# Patient Record
Sex: MALE | Race: NATIVE HAWAIIAN OR OTHER PACIFIC ISLANDER | NOT HISPANIC OR LATINO | URBAN - METROPOLITAN AREA
[De-identification: names, ages, dates, MRNs, and addresses within clinical notes are randomized per-mention and may not be internally consistent; named-entity substitution may affect disease eponyms.]

---

## 2020-11-12 ENCOUNTER — OFFICE VISIT (OUTPATIENT)
Dept: URBAN - METROPOLITAN AREA CLINIC 13 | Facility: CLINIC | Age: 65
End: 2020-11-12
Payer: MEDICARE

## 2020-11-12 PROCEDURE — 92012 INTRM OPH EXAM EST PATIENT: CPT | Performed by: OPHTHALMOLOGY

## 2020-11-12 PROCEDURE — 92134 CPTRZ OPH DX IMG PST SGM RTA: CPT | Performed by: OPHTHALMOLOGY

## 2020-11-12 ASSESSMENT — INTRAOCULAR PRESSURE
OS: 15
OD: 14

## 2020-11-12 NOTE — IMPRESSION/PLAN
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
s/p surgery in 2015 Plan: Dense TRD under SO OU Ischemic with HM and LP vision Here for eval for possible DME as he is interested in clinical trial participation D/w patient and family that limiting factor is fibrotic PVR/TRD under silicone oil, chronic in nature. Would not benefit from further intervention at this juncture and thus rec systemic control and obs. 12 month re-eval, OCT OU.

## 2021-11-29 ENCOUNTER — OFFICE VISIT (OUTPATIENT)
Dept: URBAN - METROPOLITAN AREA CLINIC 45 | Facility: CLINIC | Age: 66
End: 2021-11-29
Payer: MEDICARE

## 2021-11-29 DIAGNOSIS — H33.40 TRACTION DETACHMENT OF RETINA, UNSPECIFIED EYE: Primary | ICD-10-CM

## 2021-11-29 PROCEDURE — 92004 COMPRE OPH EXAM NEW PT 1/>: CPT | Performed by: OPHTHALMOLOGY

## 2021-11-29 ASSESSMENT — INTRAOCULAR PRESSURE
OD: 11
OS: 11

## 2021-11-29 NOTE — IMPRESSION/PLAN
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
s/p surgery in 2015 Plan: Dense TRD under SO OU - nerve white OD , pale OS

D/w patient and family that limiting factor is fibrotic PVR/TRD under silicone oil, chronic in nature. Would not benefit from further intervention at this juncture and thus rec systemic control and obs. 


1 year re-eval OPTOS / EXAM OU